# Patient Record
Sex: MALE | Race: BLACK OR AFRICAN AMERICAN | NOT HISPANIC OR LATINO | URBAN - METROPOLITAN AREA
[De-identification: names, ages, dates, MRNs, and addresses within clinical notes are randomized per-mention and may not be internally consistent; named-entity substitution may affect disease eponyms.]

---

## 2024-08-18 ENCOUNTER — HOSPITAL ENCOUNTER (EMERGENCY)
Facility: HOSPITAL | Age: 44
Discharge: HOME | End: 2024-08-18
Attending: EMERGENCY MEDICINE

## 2024-08-18 ENCOUNTER — APPOINTMENT (OUTPATIENT)
Dept: RADIOLOGY | Facility: HOSPITAL | Age: 44
End: 2024-08-18

## 2024-08-18 DIAGNOSIS — G89.29 CHRONIC PAIN OF RIGHT KNEE: Primary | ICD-10-CM

## 2024-08-18 DIAGNOSIS — M25.561 CHRONIC PAIN OF RIGHT KNEE: Primary | ICD-10-CM

## 2024-08-18 PROCEDURE — 99284 EMERGENCY DEPT VISIT MOD MDM: CPT | Performed by: EMERGENCY MEDICINE

## 2024-08-18 PROCEDURE — 99283 EMERGENCY DEPT VISIT LOW MDM: CPT

## 2024-08-18 RX ORDER — IBUPROFEN 600 MG/1
600 TABLET ORAL ONCE
Status: DISCONTINUED | OUTPATIENT
Start: 2024-08-18 | End: 2024-08-18 | Stop reason: HOSPADM

## 2024-08-18 RX ORDER — KETOROLAC TROMETHAMINE 30 MG/ML
30 INJECTION, SOLUTION INTRAMUSCULAR; INTRAVENOUS ONCE
Status: DISCONTINUED | OUTPATIENT
Start: 2024-08-18 | End: 2024-08-18

## 2024-08-18 RX ORDER — KETOROLAC TROMETHAMINE 15 MG/ML
INJECTION, SOLUTION INTRAMUSCULAR; INTRAVENOUS
Status: DISCONTINUED
Start: 2024-08-18 | End: 2024-08-18 | Stop reason: HOSPADM

## 2024-08-18 NOTE — DISCHARGE INSTRUCTIONS
You were seen today for knee pain while you are in the custody of the police.  We did offer you x-rays as well as medication for pain, however, you declined these.  Given that you are refusing care, we will discharge you at this time.  If you determined that you are still having significant knee pain, you can return here for further treatment and evaluation

## 2024-08-18 NOTE — ED PROVIDER NOTES
History of Present Illness     History provided by: Patient and Law Enforcement  Limitations to History:  agitation/behavioral  External Records Reviewed with Brief Summary: None    HPI:  Jesse Godoy is a 44 y.o. male with past medical history of recent ACL repair on the right who presents today for medical clearance.  Per police report, the patient is currently in police custody and began endorsing chest pain when they attempted to take him to [facility].  Patient is a difficult historian secondary to agitation and police presence, however, even without police, the patient provides limited history.  He does endorse that his right knee does hurt, but denies any chest pain.  He denies any nausea, vomiting, diarrhea, or abdominal pain.  He denies any substance use.  He denies any suicidal ideation, homicidal ideation, or auditory or visual hallucinations. He denies any headache, dizziness, vision changes, neck pain, or shortness of breath. He denies any falls. He does initially request pain medication as well as water, but then becomes acutely agitated.    Physical Exam   Triage vitals:  T    HR    BP    RR    O2        Physical Exam  Constitutional:       Appearance: He is not ill-appearing or diaphoretic.      Comments: Patient is acutely agitated, verbally aggressive with ED staff as well as PD, yelling    HENT:      Head: Normocephalic and atraumatic.      Mouth/Throat:      Mouth: Mucous membranes are moist.      Pharynx: No oropharyngeal exudate or posterior oropharyngeal erythema.   Eyes:      General: No scleral icterus.     Extraocular Movements: Extraocular movements intact.      Pupils: Pupils are equal, round, and reactive to light.   Cardiovascular:      Rate and Rhythm: Regular rhythm. Tachycardia present.      Pulses: Normal pulses.      Heart sounds: Normal heart sounds. No murmur heard.     No gallop.      Comments: 2+ radial pulses, 2+ DP and PT pulses bilaterally  Pulmonary:      Effort: Pulmonary  effort is normal. No respiratory distress.      Breath sounds: Normal breath sounds. No stridor. No wheezing, rhonchi or rales.   Abdominal:      General: Bowel sounds are normal. There is no distension.      Palpations: Abdomen is soft. There is no mass.      Tenderness: There is no abdominal tenderness. There is no guarding.      Hernia: No hernia is present.   Musculoskeletal:         General: No swelling, deformity or signs of injury. Normal range of motion.      Cervical back: Normal range of motion and neck supple. No tenderness.      Comments: No effusion or tenderness of the right knee, full active range of motion   Skin:     General: Skin is warm and dry.      Capillary Refill: Capillary refill takes less than 2 seconds.      Findings: No erythema, lesion or rash.   Neurological:      General: No focal deficit present.      Mental Status: He is alert and oriented to person, place, and time.      Cranial Nerves: No cranial nerve deficit.      Sensory: No sensory deficit.      Motor: No weakness.   Psychiatric:      Comments: Agitated, verbally aggressive with staff as well as PD, frequently yelling.  Denies suicidal or homicidal ideation. Denies auditory or visual hallucinations.           Medical Decision Making & ED Course   Medical Decision Makin y.o. male with a past medical history of recent ACL repair who presents today for medical clearance in police custody.  Given the patient did endorse that he was having right knee pain, I did offer him Toradol to treat his pain, but the patient declined.  I then offered him ibuprofen, but he declined this as well.  I did offer him x-rays, however, the patient declined any imaging. The patient denies any suicidal or homicidal ideation. He denies any hallucinations. He is neurologically and neurovascularly intact, moving all extremities spontaneously. Given the fact the patient does not want to comply with any further medical treatment or further examination,  we will discharge him into the custody of the police at this time.  I did provide him with return precautions. He did remain agitated and verbally aggressive with staff, and was escorted by the hospital police out into the custody of the [place] police. The patient was instructed of supportive measures and to follow-up with a primary care physician. He should feel free to return to the his Department at any time should their condition worsen or should his have any questions or concerns.   ----      Differential diagnoses considered include but are not limited to: Right knee fracture, right knee musculoskeletal sprain or strain     Social Determinants of Health which Significantly Impact Care:  Currently in custody of the police      EKG Independent Interpretation: EKG not obtained    Independent Result Review and Interpretation: None obtained    Chronic conditions affecting the patient's care: As documented above in [facility]    The patient was discussed with the following consultants/services: None    Care Considerations: As documented above in [facility]    ED Course:  Diagnoses as of 08/18/24 0346   Chronic pain of right knee     Disposition   As a result of the work-up, the patient was discharged.  he was informed of his diagnosis and instructed to come back with any concerns or worsening of condition. he was given the opportunity to ask questions.  All of the patient's questions were answered.    Procedures   Procedures    Patient seen and discussed with ED attending physician.    Vimal Mejia MD  Emergency Medicine       Vimal Mejia MD  Resident  08/18/24 0351    I saw and evaluated the patient. I personally obtained the key and critical portions of the history and physical exam or was physically present for key and critical portions performed by the resident/fellow. I reviewed the resident/fellow's documentation and discussed the patient with the resident/fellow. I agree with the resident/fellow's medical  decision making as documented in the note.    MD Ayleen Regalado MD  08/18/24 [id number]